# Patient Record
Sex: MALE | Race: WHITE | Employment: OTHER | URBAN - NONMETROPOLITAN AREA
[De-identification: names, ages, dates, MRNs, and addresses within clinical notes are randomized per-mention and may not be internally consistent; named-entity substitution may affect disease eponyms.]

---

## 2017-04-05 ENCOUNTER — OFFICE VISIT (OUTPATIENT)
Dept: OPTOMETRY | Age: 57
End: 2017-04-05
Payer: COMMERCIAL

## 2017-04-05 DIAGNOSIS — H52.13 MYOPIA OF BOTH EYES WITH ASTIGMATISM AND PRESBYOPIA: Primary | ICD-10-CM

## 2017-04-05 DIAGNOSIS — H52.203 MYOPIA OF BOTH EYES WITH ASTIGMATISM AND PRESBYOPIA: Primary | ICD-10-CM

## 2017-04-05 DIAGNOSIS — H52.4 MYOPIA OF BOTH EYES WITH ASTIGMATISM AND PRESBYOPIA: Primary | ICD-10-CM

## 2017-04-05 PROCEDURE — 92014 COMPRE OPH EXAM EST PT 1/>: CPT | Performed by: OPTOMETRIST

## 2017-04-05 RX ORDER — BENOXINATE HCL/FLUORESCEIN SOD 0.4%-0.25%
1 DROPS OPHTHALMIC (EYE) ONCE
Status: COMPLETED | OUTPATIENT
Start: 2017-04-05 | End: 2017-04-05

## 2017-04-05 RX ADMIN — Medication 1 DROP: at 11:31

## 2017-04-05 ASSESSMENT — SLIT LAMP EXAM - LIDS
COMMENTS: NORMAL
COMMENTS: NORMAL

## 2017-04-05 ASSESSMENT — REFRACTION_WEARINGRX
SPECS_TYPE: PAL
OD_AXIS: 160
OS_ADD: +2.25
OD_CYLINDER: -0.50
OD_ADD: +2.25
OS_CYLINDER: -0.75
OD_SPHERE: -0.50
OS_SPHERE: -1.25
OS_AXIS: 055

## 2017-04-05 ASSESSMENT — REFRACTION_MANIFEST
OD_AXIS: 160
OD_ADD: +2.25
OS_AXIS: 055
OD_CYLINDER: -0.50
OS_SPHERE: -1.00
OS_CYLINDER: -0.75
OS_ADD: +2.25
OD_SPHERE: -0.50

## 2017-04-05 ASSESSMENT — TONOMETRY
OS_IOP_MMHG: 16
IOP_METHOD: APPLANATION W FLURESS DROP
OD_IOP_MMHG: 17

## 2017-04-05 ASSESSMENT — VISUAL ACUITY
OD_CC: 20/20 OU
CORRECTION_TYPE: GLASSES
OS_CC: 20/20
METHOD: SNELLEN - LINEAR

## 2018-04-20 ENCOUNTER — TELEPHONE (OUTPATIENT)
Dept: PAIN MANAGEMENT | Age: 58
End: 2018-04-20

## 2019-03-29 ENCOUNTER — OFFICE VISIT (OUTPATIENT)
Dept: PODIATRY | Age: 59
End: 2019-03-29
Payer: COMMERCIAL

## 2019-03-29 VITALS — BODY MASS INDEX: 24.01 KG/M2 | WEIGHT: 153 LBS | HEIGHT: 67 IN

## 2019-03-29 DIAGNOSIS — M79.674 PAIN IN TOE OF RIGHT FOOT: ICD-10-CM

## 2019-03-29 DIAGNOSIS — L60.0 OC (ONYCHOCRYPTOSIS): Primary | ICD-10-CM

## 2019-03-29 DIAGNOSIS — B35.1 ONYCHOMYCOSIS OF TOENAIL: ICD-10-CM

## 2019-03-29 PROCEDURE — 99203 OFFICE O/P NEW LOW 30 MIN: CPT | Performed by: PODIATRIST

## 2019-03-29 PROCEDURE — 11750 EXCISION NAIL&NAIL MATRIX: CPT | Performed by: PODIATRIST

## 2019-03-29 RX ORDER — BUPIVACAINE HYDROCHLORIDE 2.5 MG/ML
5 INJECTION, SOLUTION INFILTRATION; PERINEURAL ONCE
Status: COMPLETED | OUTPATIENT
Start: 2019-03-29 | End: 2019-03-29

## 2019-03-29 RX ORDER — LANOLIN ALCOHOL/MO/W.PET/CERES
1 CREAM (GRAM) TOPICAL
COMMUNITY

## 2019-03-29 RX ORDER — ASPIRIN 81 MG/1
81 TABLET, CHEWABLE ORAL
COMMUNITY
Start: 2018-05-08

## 2019-03-29 RX ORDER — ATORVASTATIN CALCIUM 20 MG/1
20 TABLET, FILM COATED ORAL
COMMUNITY
Start: 2017-10-30

## 2019-03-29 RX ORDER — METRONIDAZOLE 7.5 MG/G
GEL TOPICAL
COMMUNITY
Start: 2019-01-13

## 2019-03-29 RX ORDER — CLONAZEPAM 0.5 MG/1
0.5 TABLET ORAL
COMMUNITY
Start: 2018-11-07

## 2019-03-29 RX ADMIN — BUPIVACAINE HYDROCHLORIDE 12.5 MG: 2.5 INJECTION, SOLUTION INFILTRATION; PERINEURAL at 10:30

## 2019-03-29 ASSESSMENT — ENCOUNTER SYMPTOMS
COLOR CHANGE: 0
SHORTNESS OF BREATH: 0
NAUSEA: 0
BACK PAIN: 0
DIARRHEA: 0

## 2019-04-04 ENCOUNTER — OFFICE VISIT (OUTPATIENT)
Dept: OPTOMETRY | Age: 59
End: 2019-04-04
Payer: COMMERCIAL

## 2019-04-04 DIAGNOSIS — H52.4 MYOPIA OF BOTH EYES WITH ASTIGMATISM AND PRESBYOPIA: Primary | ICD-10-CM

## 2019-04-04 DIAGNOSIS — H52.203 MYOPIA OF BOTH EYES WITH ASTIGMATISM AND PRESBYOPIA: Primary | ICD-10-CM

## 2019-04-04 DIAGNOSIS — H52.13 MYOPIA OF BOTH EYES WITH ASTIGMATISM AND PRESBYOPIA: Primary | ICD-10-CM

## 2019-04-04 PROCEDURE — 92014 COMPRE OPH EXAM EST PT 1/>: CPT | Performed by: OPTOMETRIST

## 2019-04-04 ASSESSMENT — VISUAL ACUITY
OS_CC: 20/20
OS_CC+: -1
OD_CC+: -1
METHOD: SNELLEN - LINEAR
CORRECTION_TYPE: GLASSES
OD_CC: 20/20 OU

## 2019-04-04 ASSESSMENT — SLIT LAMP EXAM - LIDS
COMMENTS: NORMAL
COMMENTS: NORMAL

## 2019-04-04 ASSESSMENT — REFRACTION_MANIFEST
OS_CYLINDER: -0.75
OD_SPHERE: PLANO
OD_ADD: +2.25
OD_CYLINDER: -1.00
OS_SPHERE: -0.50
OS_ADD: +2.25
OS_AXIS: 070
OD_AXIS: 160

## 2019-04-04 ASSESSMENT — TONOMETRY
OD_IOP_MMHG: 10
IOP_METHOD: NON-CONTACT AIR PUFF
OS_IOP_MMHG: 10

## 2019-04-04 ASSESSMENT — KERATOMETRY
OS_AXISANGLE2_DEGREES: 000
OS_K2POWER_DIOPTERS: 44.25
OS_AXISANGLE_DEGREES: 090
OS_K1POWER_DIOPTERS: 44.25

## 2019-04-04 ASSESSMENT — REFRACTION_WEARINGRX
OD_AXIS: 160
OD_ADD: +2.25
OS_CYLINDER: -0.75
OD_SPHERE: -0.50
OS_AXIS: 055
SPECS_TYPE: PAL
OS_ADD: +2.25
OS_SPHERE: -1.00
OD_CYLINDER: -0.50

## 2019-04-04 NOTE — PROGRESS NOTES
Kadie Black presents today for   Chief Complaint   Patient presents with    Vision Exam   .    HPI     Last Vision Exam: 4/5/2017 Aw  Last Ophthalmology Exam: 5/21/2018 Dr. Hany Posey Rx: 4/5/2017  Insurance: Angie Taylor  Update: Glasses  No changes in vision  Hx of stroke in 4/2006  Time for a check up   Puts the glasses on for far and near and when wants to see  Reading a book, the eyes don't seem as good            Current Outpatient Medications   Medication Sig Dispense Refill    aspirin 81 MG chewable tablet Take 81 mg by mouth      co-enzyme Q-10 30 MG capsule Take 30 mg by mouth      metroNIDAZOLE (METROGEL) 0.75 % gel APPLY AND RUB IN A THIN FILM TO AFFECTED AREAS TWICE DAILY IN THE MORNING AND EVENING      atorvastatin (LIPITOR) 20 MG tablet Take 20 mg by mouth      clonazePAM (KLONOPIN) 0.5 MG tablet Take 0.5 mg by mouth.       glucosamine-chondroitin 500-400 MG tablet Take 1 tablet by mouth      propranolol (INDERAL) 20 MG tablet Take 20 mg by mouth 2 times daily      traZODone (DESYREL) 150 MG tablet Take 300 mg by mouth nightly      tiZANidine (ZANAFLEX) 4 MG tablet Take 4 mg by mouth every 6 hours as needed      hydrOXYzine (ATARAX) 25 MG tablet Take 50 mg by mouth 2 times daily as needed for Anxiety      OLIVE LEAF EXTRACT PO Take by mouth      b complex vitamins capsule Take 1 capsule by mouth daily      Saw Palmetto, Serenoa repens, (SAW PALMETTO PO) Take by mouth      cyclobenzaprine (FLEXERIL) 10 MG tablet Take 10 mg by mouth 3 times daily as needed for Muscle spasms       Current Facility-Administered Medications   Medication Dose Route Frequency Provider Last Rate Last Dose    fluorescein-benoxinate (FLURESS) ophthalmic solution 1 drop  1 drop Both Eyes Once Louise Gallardo, OD             Family History   Problem Relation Age of Onset    Alcohol Abuse Father     Diabetes Other         aunt    Stroke Mother 79    Heart Disease Mother     High Blood history of in 0    Myopia with astigmatism and presbyopia     Paralytic strabismus     history of    Patent foramen ovale     history of     Rosacea     history of    Stroke [facility])     history of x3           Main Ophthalmology Exam     External Exam       Right Left    External Normal Normal          Slit Lamp Exam       Right Left    Lids/Lashes Normal Normal    Conjunctiva/Sclera White and quiet White and quiet    Cornea Clear Clear    Anterior Chamber Deep and quiet Deep and quiet    Iris Round and reactive Round and reactive    Lens Clear Clear    Vitreous Normal Normal          Fundus Exam       Right Left    Disc Normal Normal    C/D Ratio 0.25-.2 0.25-.2    Macula Normal Normal    Vessels Normal Normal                   Tonometry     Tonometry (Non-contact air puff, 9:52 AM)       Right Left    Pressure 10 10   IOPg:  10.2             8.6  CH:  11.6          10.8  WS: 7.7          9.3                  Not recorded         Not recorded          Visual Acuity (Snellen - Linear)       Right Left    Dist cc 20/20 -1 20/20 -1    Near cc 20/20 OU     Correction:  Glasses          Pupils     Pupils       Pupils    Right PERRL    Left PERRL               Not recorded        Keratometry     Keratometry       K1 Axis K2 Axis    Right        Left 44.25 000 44.25 090                  Ophthalmology Exam     Wearing Rx       Sphere Cylinder Axis Add    Right -0.50 -0.50 160 +2.25    Left -1.00 -0.75 055 +2.25    Age:  1yrs    Type:  PAL              Manifest Refraction     Manifest Refraction       Sphere Cylinder Axis Dist [place] Add    Right [place] -1.00 160 20/20 +2.25    Left -0.50 -0.75 070 20/20 +2.25          Manifest Refraction #2 (Auto)       Sphere Cylinder Axis Dist VA Add    Right +0.25 -1.00 166      Left -0.50 -0.75 072                 Final Rx       Sphere Cylinder Axis Add    Right [place] -1.00 160 +2.25    Left -0.50 -0.75 070 +2.25    Type:  PAL    Expiration Date:  4/4/2021            1. Myopia of both eyes with astigmatism and presbyopia           Patient Instructions   New glasses recommended      Return in about 2 years (around 4/4/2021) for complete eye exam.

## 2019-04-12 ENCOUNTER — OFFICE VISIT (OUTPATIENT)
Dept: PODIATRY | Age: 59
End: 2019-04-12
Payer: COMMERCIAL

## 2019-04-12 VITALS — HEIGHT: 67 IN | BODY MASS INDEX: 24.64 KG/M2 | WEIGHT: 157 LBS

## 2019-04-12 DIAGNOSIS — L60.0 OC (ONYCHOCRYPTOSIS): Primary | ICD-10-CM

## 2019-04-12 DIAGNOSIS — M79.674 PAIN IN TOE OF RIGHT FOOT: ICD-10-CM

## 2019-04-12 DIAGNOSIS — B35.1 ONYCHOMYCOSIS OF TOENAIL: ICD-10-CM

## 2019-04-12 PROCEDURE — 99213 OFFICE O/P EST LOW 20 MIN: CPT | Performed by: PODIATRIST

## 2019-04-12 RX ORDER — CLOTRIMAZOLE AND BETAMETHASONE DIPROPIONATE 10; .64 MG/G; MG/G
CREAM TOPICAL
Refills: 0 | COMMUNITY
Start: 2019-04-08

## 2019-04-12 ASSESSMENT — ENCOUNTER SYMPTOMS
BACK PAIN: 0
NAUSEA: 0
COLOR CHANGE: 0
DIARRHEA: 0
SHORTNESS OF BREATH: 0

## 2019-04-12 NOTE — PROGRESS NOTES
foot           Plan: 1. Clinical evaluation of the patient. 2. Patient informed that he has adequately healed and no longer requires dressing changes to the right great toe. 3. Return if symptoms worsen or fail to improve.    4/12/2019      Tonny Sanford DPM

## 2021-05-20 ENCOUNTER — OFFICE VISIT (OUTPATIENT)
Dept: OPTOMETRY | Age: 61
End: 2021-05-20
Payer: COMMERCIAL

## 2021-05-20 DIAGNOSIS — H52.203 MYOPIA OF BOTH EYES WITH ASTIGMATISM AND PRESBYOPIA: Primary | ICD-10-CM

## 2021-05-20 DIAGNOSIS — H52.4 MYOPIA OF BOTH EYES WITH ASTIGMATISM AND PRESBYOPIA: Primary | ICD-10-CM

## 2021-05-20 DIAGNOSIS — H52.13 MYOPIA OF BOTH EYES WITH ASTIGMATISM AND PRESBYOPIA: Primary | ICD-10-CM

## 2021-05-20 PROCEDURE — 92014 COMPRE OPH EXAM EST PT 1/>: CPT | Performed by: OPTOMETRIST

## 2021-05-20 ASSESSMENT — REFRACTION_MANIFEST
OD_CYLINDER: -0.75
OD_ADD: +2.25
OS_SPHERE: PLANO
OS_CYLINDER: -1.00
OS_ADD: +2.25
OS_AXIS: 070
OD_AXIS: 155
OD_SPHERE: +0.25

## 2021-05-20 ASSESSMENT — ENCOUNTER SYMPTOMS
GASTROINTESTINAL NEGATIVE: 0
ALLERGIC/IMMUNOLOGIC NEGATIVE: 0
EYES NEGATIVE: 0
RESPIRATORY NEGATIVE: 0

## 2021-05-20 ASSESSMENT — REFRACTION_WEARINGRX
OS_SPHERE: -0.50
OD_ADD: +2.25
OS_AXIS: 070
OS_ADD: +2.25
OD_AXIS: 160
OS_CYLINDER: -0.75
OD_SPHERE: PLANO
OD_CYLINDER: -1.00
SPECS_TYPE: PAL

## 2021-05-20 ASSESSMENT — VISUAL ACUITY
OD_CC: 20/20 OU
CORRECTION_TYPE: GLASSES
OD_CC+: -2
OS_CC+: -1
METHOD: SNELLEN - LINEAR
OS_CC: 20/25

## 2021-05-20 ASSESSMENT — TONOMETRY
IOP_METHOD: NON-CONTACT AIR PUFF
OD_IOP_MMHG: 14
OS_IOP_MMHG: 13

## 2021-05-20 ASSESSMENT — SLIT LAMP EXAM - LIDS
COMMENTS: NORMAL
COMMENTS: NORMAL
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
